# Patient Record
Sex: FEMALE | Race: BLACK OR AFRICAN AMERICAN | NOT HISPANIC OR LATINO
[De-identification: names, ages, dates, MRNs, and addresses within clinical notes are randomized per-mention and may not be internally consistent; named-entity substitution may affect disease eponyms.]

---

## 2017-02-22 ENCOUNTER — APPOINTMENT (OUTPATIENT)
Dept: SPINE | Facility: CLINIC | Age: 52
End: 2017-02-22

## 2017-02-22 VITALS
SYSTOLIC BLOOD PRESSURE: 138 MMHG | WEIGHT: 293 LBS | DIASTOLIC BLOOD PRESSURE: 86 MMHG | HEART RATE: 86 BPM | HEIGHT: 65 IN | BODY MASS INDEX: 48.82 KG/M2

## 2017-02-22 DIAGNOSIS — Z82.49 FAMILY HISTORY OF ISCHEMIC HEART DISEASE AND OTHER DISEASES OF THE CIRCULATORY SYSTEM: ICD-10-CM

## 2017-02-22 RX ORDER — HYDROCHLOROTHIAZIDE 25 MG/1
25 TABLET ORAL
Refills: 0 | Status: ACTIVE | COMMUNITY

## 2017-02-22 RX ORDER — AMLODIPINE BESYLATE 5 MG/1
5 TABLET ORAL
Qty: 90 | Refills: 0 | Status: ACTIVE | COMMUNITY
Start: 2017-02-09

## 2017-02-23 ENCOUNTER — APPOINTMENT (OUTPATIENT)
Dept: OTOLARYNGOLOGY | Facility: CLINIC | Age: 52
End: 2017-02-23

## 2017-02-23 VITALS
WEIGHT: 293 LBS | HEIGHT: 66 IN | BODY MASS INDEX: 47.09 KG/M2 | SYSTOLIC BLOOD PRESSURE: 127 MMHG | DIASTOLIC BLOOD PRESSURE: 72 MMHG | HEART RATE: 70 BPM

## 2017-02-23 DIAGNOSIS — I10 ESSENTIAL (PRIMARY) HYPERTENSION: ICD-10-CM

## 2017-08-02 ENCOUNTER — APPOINTMENT (OUTPATIENT)
Dept: SPINE | Facility: CLINIC | Age: 52
End: 2017-08-02
Payer: COMMERCIAL

## 2017-08-02 VITALS
HEART RATE: 73 BPM | HEIGHT: 65 IN | DIASTOLIC BLOOD PRESSURE: 86 MMHG | WEIGHT: 293 LBS | SYSTOLIC BLOOD PRESSURE: 121 MMHG | BODY MASS INDEX: 48.82 KG/M2

## 2017-08-02 PROCEDURE — 99214 OFFICE O/P EST MOD 30 MIN: CPT

## 2017-08-04 ENCOUNTER — APPOINTMENT (OUTPATIENT)
Dept: OTOLARYNGOLOGY | Facility: CLINIC | Age: 52
End: 2017-08-04
Payer: COMMERCIAL

## 2017-08-04 VITALS
WEIGHT: 293 LBS | HEART RATE: 63 BPM | BODY MASS INDEX: 48.82 KG/M2 | DIASTOLIC BLOOD PRESSURE: 98 MMHG | SYSTOLIC BLOOD PRESSURE: 153 MMHG | HEIGHT: 65 IN

## 2017-08-04 PROCEDURE — 92550 TYMPANOMETRY & REFLEX THRESH: CPT

## 2017-08-04 PROCEDURE — 31575 DIAGNOSTIC LARYNGOSCOPY: CPT

## 2017-08-04 PROCEDURE — 92588 EVOKED AUDITORY TST COMPLETE: CPT

## 2017-08-04 PROCEDURE — 92557 COMPREHENSIVE HEARING TEST: CPT

## 2017-08-04 PROCEDURE — 99214 OFFICE O/P EST MOD 30 MIN: CPT | Mod: 25

## 2018-08-08 ENCOUNTER — APPOINTMENT (OUTPATIENT)
Dept: SPINE | Facility: CLINIC | Age: 53
End: 2018-08-08
Payer: COMMERCIAL

## 2018-08-08 VITALS
WEIGHT: 293 LBS | DIASTOLIC BLOOD PRESSURE: 86 MMHG | HEART RATE: 69 BPM | SYSTOLIC BLOOD PRESSURE: 141 MMHG | BODY MASS INDEX: 48.82 KG/M2 | HEIGHT: 65 IN

## 2018-08-08 PROCEDURE — 99214 OFFICE O/P EST MOD 30 MIN: CPT

## 2018-08-29 ENCOUNTER — APPOINTMENT (OUTPATIENT)
Dept: OTOLARYNGOLOGY | Facility: CLINIC | Age: 53
End: 2018-08-29
Payer: COMMERCIAL

## 2018-08-29 VITALS
BODY MASS INDEX: 48.82 KG/M2 | WEIGHT: 293 LBS | HEIGHT: 65 IN | HEART RATE: 74 BPM | DIASTOLIC BLOOD PRESSURE: 76 MMHG | SYSTOLIC BLOOD PRESSURE: 132 MMHG

## 2018-08-29 DIAGNOSIS — Z87.898 PERSONAL HISTORY OF OTHER SPECIFIED CONDITIONS: ICD-10-CM

## 2018-08-29 PROCEDURE — 92550 TYMPANOMETRY & REFLEX THRESH: CPT

## 2018-08-29 PROCEDURE — 92557 COMPREHENSIVE HEARING TEST: CPT

## 2018-08-29 PROCEDURE — 99213 OFFICE O/P EST LOW 20 MIN: CPT | Mod: 25

## 2018-08-29 PROCEDURE — 31575 DIAGNOSTIC LARYNGOSCOPY: CPT

## 2018-09-16 PROBLEM — Z87.898 HISTORY OF EPISTAXIS: Status: RESOLVED | Noted: 2017-02-24 | Resolved: 2018-09-16

## 2019-08-14 ENCOUNTER — APPOINTMENT (OUTPATIENT)
Dept: SPINE | Facility: CLINIC | Age: 54
End: 2019-08-14
Payer: COMMERCIAL

## 2019-08-14 VITALS
SYSTOLIC BLOOD PRESSURE: 130 MMHG | HEIGHT: 65 IN | WEIGHT: 293 LBS | BODY MASS INDEX: 48.82 KG/M2 | DIASTOLIC BLOOD PRESSURE: 80 MMHG

## 2019-08-14 PROCEDURE — 99214 OFFICE O/P EST MOD 30 MIN: CPT

## 2019-08-14 NOTE — DATA REVIEWED
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 Compared to other imaging done in 2018, 2017 and 2016

## 2019-08-14 NOTE — RESULTS/DATA
[FreeTextEntry1] : No significant change in the size of glomus juglare tumor when compared to previous MRIs. Patient to return to office in 1 year and 1/2 with a new MRI brain [facility] IVC

## 2019-08-14 NOTE — REASON FOR VISIT
[Follow-Up: _____] : a [unfilled] follow-up visit [FreeTextEntry1] : Patient is doing well and offers no complaints. She had recent surgery to her left foot for removal of a benign mass. She is using a cane for ambulation

## 2019-08-14 NOTE — PHYSICAL EXAM
[General Appearance - Well Nourished] : well nourished [General Appearance - Alert] : alert [Cranial Nerves Optic (II)] : visual acuity intact bilaterally,  pupils equal round and reactive to light [Cranial Nerves Oculomotor (III)] : extraocular motion intact [Cranial Nerves Trigeminal (V)] : facial sensation intact symmetrically [Cranial Nerves Facial (VII)] : face symmetrical [Cranial Nerves Vestibulocochlear (VIII)] : hearing was intact bilaterally [Cranial Nerves Glossopharyngeal (IX)] : tongue and palate midline [Cranial Nerves Accessory (XI - Cranial And Spinal)] : head turning and shoulder shrug symmetric [Cranial Nerves Hypoglossal (XII)] : there was no tongue deviation with protrusion [Motor Tone] : muscle tone was normal in all four extremities [Motor Strength] : muscle strength was normal in all four extremities [5] : L3 quadriceps 5/5 [Balance] : balance was intact [2+] : Patella right 2+ [Sclera] : the sclera and conjunctiva were normal [PERRL With Normal Accommodation] : pupils were equal in size, round, reactive to light, with normal accommodation [] : no respiratory distress [Exaggerated Use Of Accessory Muscles For Inspiration] : no accessory muscle use

## 2019-08-20 ENCOUNTER — APPOINTMENT (OUTPATIENT)
Dept: OTOLARYNGOLOGY | Facility: CLINIC | Age: 54
End: 2019-08-20
Payer: COMMERCIAL

## 2019-08-20 VITALS
BODY MASS INDEX: 48.82 KG/M2 | SYSTOLIC BLOOD PRESSURE: 144 MMHG | HEART RATE: 92 BPM | DIASTOLIC BLOOD PRESSURE: 80 MMHG | WEIGHT: 293 LBS | HEIGHT: 65 IN

## 2019-08-20 DIAGNOSIS — Z92.3 PERSONAL HISTORY OF IRRADIATION: ICD-10-CM

## 2019-08-20 PROCEDURE — 99213 OFFICE O/P EST LOW 20 MIN: CPT

## 2019-08-20 PROCEDURE — 92557 COMPREHENSIVE HEARING TEST: CPT

## 2019-08-20 PROCEDURE — 92550 TYMPANOMETRY & REFLEX THRESH: CPT

## 2019-08-23 PROBLEM — Z92.3 STATUS POST GAMMA KNIFE TREATMENT: Status: RESOLVED | Noted: 2019-08-23 | Resolved: 2019-08-23

## 2019-08-23 NOTE — HISTORY OF PRESENT ILLNESS
[de-identified] : Ms. 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 No problems swallowing. \par Does not wear hearing aid much.  Denies changes in hearing since last year. 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

## 2019-08-23 NOTE — ASSESSMENT
[FreeTextEntry1] : Ms. 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  Has hearing aid but doesn’t use it.\par 4.) Tinnitus left ear - not notieable lately\par \par Plan:\par -- f//up with Dr. Arce also\par -- Repeat audiogram at next visit\par \par Return in 18 months\par

## 2019-08-23 NOTE — PHYSICAL EXAM
[de-identified] : TMs intact bilateral.  Prominent red mass in inferior 1/4 of space behind TM on left side. [] : septum deviated to the left [Midline] : trachea located in midline position [de-identified] : Mild atrophy and fasciculation left hemitongue.  Tongue protrudes to left. [de-identified] : Palate elevation is asymmetric, with eft side weaker. [Normal] : cranial nerves 2-12 intact [de-identified] : except for left XII; decreased left hearing and decreased left palate elevation.

## 2019-08-23 NOTE — CONSULT LETTER
[Dear  ___] : Dear  [unfilled], [Courtesy Letter:] : I had the pleasure of seeing your patient, [unfilled], in my office today. [Sincerely,] : Sincerely, [Consult Closing:] : Thank you very much for allowing me to participate in the care of this patient.  If you have any questions, please do not hesitate to contact me. [DrIsaac  ___] : Dr. YADAV [___] : [unfilled] [FreeTextEntry2] : Viktoriya Rajan MD\par [address]\[place] [address] [FreeTextEntry1] : \par Enclosed please find my office notes for August 20, 2019. \par \par \par \par  [FreeTextEntry3] : Jaylin Hoang MD \par Otolaryngology, Head and Neck Surgery\par

## 2020-12-04 ENCOUNTER — APPOINTMENT (OUTPATIENT)
Dept: OTOLARYNGOLOGY | Facility: CLINIC | Age: 55
End: 2020-12-04

## 2020-12-05 NOTE — HISTORY OF PRESENT ILLNESS
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

## 2020-12-05 NOTE — DATA REVIEWED
[de-identified] : MRI brain with and without contrast 8/12/2020:\par Impression:\par Stable left sided glomus jugulare tumor\par 2. Otherwise unremarkable MRI of the brain without significant change

## 2020-12-07 ENCOUNTER — APPOINTMENT (OUTPATIENT)
Dept: NEUROSURGERY | Facility: CLINIC | Age: 55
End: 2020-12-07

## 2020-12-09 ENCOUNTER — APPOINTMENT (OUTPATIENT)
Dept: SPINE | Facility: CLINIC | Age: 55
End: 2020-12-09
Payer: COMMERCIAL

## 2020-12-09 VITALS
DIASTOLIC BLOOD PRESSURE: 84 MMHG | WEIGHT: 293 LBS | HEIGHT: 65 IN | TEMPERATURE: 97.9 F | BODY MASS INDEX: 48.82 KG/M2 | OXYGEN SATURATION: 97 % | HEART RATE: 88 BPM | SYSTOLIC BLOOD PRESSURE: 152 MMHG

## 2020-12-09 PROCEDURE — 99213 OFFICE O/P EST LOW 20 MIN: CPT

## 2020-12-09 PROCEDURE — 99072 ADDL SUPL MATRL&STAF TM PHE: CPT

## 2020-12-09 RX ORDER — CHROMIUM 200 MCG
TABLET ORAL
Refills: 0 | Status: ACTIVE | COMMUNITY

## 2020-12-18 ENCOUNTER — APPOINTMENT (OUTPATIENT)
Dept: OTOLARYNGOLOGY | Facility: CLINIC | Age: 55
End: 2020-12-18
Payer: COMMERCIAL

## 2020-12-18 VITALS
DIASTOLIC BLOOD PRESSURE: 103 MMHG | HEIGHT: 65 IN | TEMPERATURE: 95.6 F | SYSTOLIC BLOOD PRESSURE: 167 MMHG | HEART RATE: 71 BPM | WEIGHT: 293 LBS | BODY MASS INDEX: 48.82 KG/M2

## 2020-12-18 PROCEDURE — 99214 OFFICE O/P EST MOD 30 MIN: CPT

## 2020-12-18 PROCEDURE — 92557 COMPREHENSIVE HEARING TEST: CPT

## 2020-12-18 PROCEDURE — 92550 TYMPANOMETRY & REFLEX THRESH: CPT

## 2020-12-18 PROCEDURE — 99072 ADDL SUPL MATRL&STAF TM PHE: CPT

## 2020-12-31 NOTE — PHYSICAL EXAM
[Midline] : trachea located in midline position [Normal] : no rashes [de-identified] : Right TM intact, no effusion.  Left TM intact; red mass seen in lower portion middle ear. [] : septum deviated to the left [de-identified] : Mild atrophy and fasciculation of tongue, left side.  Tongue protrudes to left. [de-identified] : Left soft palate elevation is very weak; right side intact. [de-identified] : Carotid pulses 2+ bilateral.  [de-identified] : intact except for left CN XII, IX; decreased hearing on left side.

## 2020-12-31 NOTE — CONSULT LETTER
[Dear  ___] : Dear  [unfilled], [Courtesy Letter:] : I had the pleasure of seeing your patient, [unfilled], in my office today. [Consult Closing:] : Thank you very much for allowing me to participate in the care of this patient.  If you have any questions, please do not hesitate to contact me. [Sincerely,] : Sincerely, [DrIsaac  ___] : Dr. YADAV [___] : [unfilled] [Please see my note below.] : Please see my note below. [FreeTextEntry2] : Viktoriya Rajan MD\par [address]\[place] [address] [FreeTextEntry1] : \par \par \par \par  [FreeTextEntry3] : Jaylin Hoang MD \par Otolaryngology, Head and Neck Surgery\par

## 2020-12-31 NOTE — HISTORY OF PRESENT ILLNESS
[de-identified] : Ms. 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 Denies changes in hearing since last year.  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 \par Known left hypoglossal nerve palsy.  No speech or swallowing problems.\par Recent MRI brain showed lesion was "stable, if not slightly smaller", per Dr. Arce, with whom she has next f/up in 2 years.\par \par No bleeding or irritation from nose.\par \par \par MRI BRAIN with and without contrast  (8/27/2020) at [facility]:\par - Stable left glomus jugulare tumor\par - Otherwise unremarkable MRI of brain without significant change.\par \par MRI BRAIN with and without contrast  (8/07/2019) at [facility]:\par --  Compared to other imaging done in 2018, 2017 and 2016\par -- No significant change in the size of the glomus juglare tumor.\par \par MRI BRAIN with and without contrast (7-) at [facility]:\par - No change in size of left glomus jugulare\par \par MRI BRAIN with and without contrast (7-) at [facility]:\par - Stable appearing multilobulated lesion involving the left jugular foramen when compared to 01/24/2017.\par

## 2020-12-31 NOTE — ASSESSMENT
[FreeTextEntry1] : Ms. 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  Portion still visible behind TM today; stable size.\par --> lower salt intake in diet\par --> f/u with Dr. Arce as scheduled\par \par 2.) High frequency SNHL left ear - stable.  Has hearing aid since 2016 but not getting much benefit now\par --> f/up with her vending audiologist re update of her hearing aid.  Otherwise new device may be better.\par \par 3.) Left hypoglossal and glossopharyngeal nerve palsies; stable \par \par Return in 2 years\par

## 2020-12-31 NOTE — DATA REVIEWED
[de-identified] : \par AUDIOGRAM  (12/18/2020)\par RIGHT:  Hearing within normal limits. \par LEFT:   Hearing within normal limits through 2K Hz, sloping to mild SNHL\par Hearing is essentially stable compared to audiogram from 2017 and 2018.\par Tympanograms  A  bilateral \par Word recognition 90% on right, 100%  on left \par \par AUDIOGRAM  (08/20/2019)\par RIGHT:  Hearing within normal limits. \par LEFT:   Hearing within normal limits through 1.5 K Hz, sloping to mild to moderate SNHL\par Hearing is essentially stable compared to audiogram from 2017 and 2018.\par Tympanograms  A on right; A on left\par Word recognition 100%  bilateral \par \par AUDIOGRAM  (08/29/2018)\par RIGHT:  Hearing within normal limits. \par LEFT:   Hearing within normal limits through 1.5 K Hz, sloping to mild to moderate SNHL\par Hearing is essentially stable compared to audiogram from 2017.\par Tympanograms  A on right; As on left\par Word recognition 100%  bilateral \par \par AUDIOGRAM (8/4/2017)\par RIGHT:  Hearing within normal limits.  \par LEFT:   Hearing within normal limits except for mild SNHL 2-8K Hz.\par Tympanograms  As on right, A on left\par Word recognition 100%  bilateral  \par OAEs:  present on Right, absent on Left\par

## 2021-09-16 ENCOUNTER — APPOINTMENT (OUTPATIENT)
Dept: OTOLARYNGOLOGY | Facility: CLINIC | Age: 56
End: 2021-09-16
Payer: COMMERCIAL

## 2021-09-16 VITALS
TEMPERATURE: 96.3 F | DIASTOLIC BLOOD PRESSURE: 101 MMHG | HEIGHT: 65 IN | SYSTOLIC BLOOD PRESSURE: 178 MMHG | HEART RATE: 81 BPM

## 2021-09-16 DIAGNOSIS — H91.92 UNSPECIFIED HEARING LOSS, LEFT EAR: ICD-10-CM

## 2021-09-16 PROCEDURE — 99214 OFFICE O/P EST MOD 30 MIN: CPT

## 2021-10-24 PROBLEM — H91.92 HEARING LOSS OF LEFT EAR: Status: RESOLVED | Noted: 2017-02-23 | Resolved: 2021-10-24

## 2021-10-24 NOTE — HISTORY OF PRESENT ILLNESS
[de-identified] : Ms. Kulkarni c/o Left ear pain sharp and strong that started suddenly last week; felt like she could touch area in her ear canal.  Seen at Urgent Care over weekend and given ofloxacin drops. \par Today ear pain is much less.  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  Followed by Dr. Arce.\par Known left hypoglossal nerve palsy. No speech or swallowing problems.  Next MRI due in 2022.\par No bleeding or irritation from nose.\par \par \par MRI BRAIN with and without contrast (8/27/2020) at [facility]:\par - Stable left glomus jugulare tumor\par - Otherwise unremarkable MRI of brain without significant change.\par \par MRI BRAIN with and without contrast (8/07/2019) at [facility]:\par -- Compared to other imaging done in 2018, 2017 and 2016\par -- No significant change in the size of the glomus juglare tumor.\par \par MRI BRAIN with and without contrast (7-) at [facility]:\par - No change in size of left glomus jugulare\par \par MRI BRAIN with and without contrast (7-) at [facility]:\par - Stable appearing multilobulated lesion involving the left jugular foramen when compared to 01/24/2017.\par

## 2021-10-24 NOTE — PHYSICAL EXAM
[FreeTextEntry1] : No hoarseness.  [de-identified] : RIGHT EAC clear.  LEFT EAC with healing furuncle at meatus. [de-identified] : RIGHT TM intact, no effusion.  LEFT TM intact; red mass seen in lower portion middle ear. [Normal] : palatine tonsils are normal [Midline] : trachea located in midline position [de-identified] : Mild atrophy and fasciculation of tongue, left side.  Tongue protrudes to left. [de-identified] : Left soft palate elevation is minimal; right side intact. [de-identified] : Deficits of left 8, 9 and 12 CNs.

## 2021-10-24 NOTE — CONSULT LETTER
[Dear  ___] : Dear  [unfilled], [Courtesy Letter:] : I had the pleasure of seeing your patient, [unfilled], in my office today. [Please see my note below.] : Please see my note below. [Consult Closing:] : Thank you very much for allowing me to participate in the care of this patient.  If you have any questions, please do not hesitate to contact me. [Sincerely,] : Sincerely, [FreeTextEntry2] : Viktoriya Rajan MD\par [address]\[place] [address] [FreeTextEntry1] : \par \par \par \par  [FreeTextEntry3] : Jaylin Hoang MD \par Otolaryngology, Head and Neck Surgery\par  [DrIsaac  ___] : Dr. YADAV

## 2021-10-24 NOTE — ASSESSMENT
[FreeTextEntry1] : Ms. BEDOLLA had the following issues addressed today:\par \par 1.) Left ear pain that started last week and is resolving now seems due to left EAC furuncle.\par If pain/swelling returns, wipe area of skin with alcohol or facial toner.\par \par 2.) Tinnitus and glomus jugulare tumor on left side are stable on serial exams and  MRIs.  \par High frequency SNHL left ear - stable.  \par The tumor is still visible behind left TM today.   Known CN 9 and 12 deficits on left also.\par --> f/u with Dr. Arce as scheduled\par \par Return in 1 year\par

## 2021-10-24 NOTE — DATA REVIEWED
[de-identified] : \par AUDIOGRAM  (12/18/2020)\par RIGHT:  Hearing within normal limits. \par LEFT:   Hearing within normal limits through 2K Hz, sloping to mild SNHL\par Hearing is essentially stable compared to audiogram from 2017 and 2018.\par Tympanograms  A  bilateral \par Word recognition 90% on right, 100%  on left \par \par AUDIOGRAM  (08/20/2019)\par RIGHT:  Hearing within normal limits. \par LEFT:   Hearing within normal limits through 1.5 K Hz, sloping to mild to moderate SNHL\par Hearing is essentially stable compared to audiogram from 2017 and 2018.\par Tympanograms  A on right; A on left\par Word recognition 100%  bilateral \par \par AUDIOGRAM  (08/29/2018)\par RIGHT:  Hearing within normal limits. \par LEFT:   Hearing within normal limits through 1.5 K Hz, sloping to mild to moderate SNHL\par Hearing is essentially stable compared to audiogram from 2017.\par Tympanograms  A on right; As on left\par Word recognition 100%  bilateral \par \par AUDIOGRAM (8/4/2017)\par RIGHT:  Hearing within normal limits.  \par LEFT:   Hearing within normal limits except for mild SNHL 2-8K Hz.\par Tympanograms  As on right, A on left\par Word recognition 100%  bilateral  \par OAEs:  present on Right, absent on Left\par

## 2022-08-04 ENCOUNTER — APPOINTMENT (OUTPATIENT)
Dept: OTOLARYNGOLOGY | Facility: CLINIC | Age: 57
End: 2022-08-04

## 2022-08-17 ENCOUNTER — APPOINTMENT (OUTPATIENT)
Dept: SPINE | Facility: CLINIC | Age: 57
End: 2022-08-17

## 2022-08-17 VITALS
SYSTOLIC BLOOD PRESSURE: 136 MMHG | OXYGEN SATURATION: 84 % | WEIGHT: 293 LBS | HEART RATE: 86 BPM | BODY MASS INDEX: 48.82 KG/M2 | HEIGHT: 65 IN | DIASTOLIC BLOOD PRESSURE: 77 MMHG

## 2022-08-17 PROCEDURE — 99213 OFFICE O/P EST LOW 20 MIN: CPT

## 2022-10-06 ENCOUNTER — APPOINTMENT (OUTPATIENT)
Dept: OTOLARYNGOLOGY | Facility: CLINIC | Age: 57
End: 2022-10-06

## 2022-10-06 VITALS
BODY MASS INDEX: 48.82 KG/M2 | WEIGHT: 293 LBS | HEART RATE: 86 BPM | TEMPERATURE: 95.9 F | HEIGHT: 65 IN | DIASTOLIC BLOOD PRESSURE: 120 MMHG | SYSTOLIC BLOOD PRESSURE: 163 MMHG

## 2022-10-06 DIAGNOSIS — J31.0 CHRONIC RHINITIS: ICD-10-CM

## 2022-10-06 DIAGNOSIS — H60.02 ABSCESS OF LEFT EXTERNAL EAR: ICD-10-CM

## 2022-10-06 DIAGNOSIS — H92.02 OTALGIA, LEFT EAR: ICD-10-CM

## 2022-10-06 PROCEDURE — 92550 TYMPANOMETRY & REFLEX THRESH: CPT | Mod: 52

## 2022-10-06 PROCEDURE — 92557 COMPREHENSIVE HEARING TEST: CPT

## 2022-10-06 PROCEDURE — 99213 OFFICE O/P EST LOW 20 MIN: CPT

## 2022-10-06 NOTE — DATA REVIEWED
[de-identified] : \par AUDIOGRAM  (10/06/2022)\par RIGHT:  Hearing within normal limits. \par LEFT:   Hearing within normal limits through 2K Hz, sloping to profound SNHL\par Hearing is worse compared to audiogram from 2020\par Tympanograms  A on right; As on left\par Word recognition 100% bilateral  \par \par AUDIOGRAM  (12/18/2020)\par RIGHT:  Hearing within normal limits. \par LEFT:   Hearing within normal limits through 2K Hz, sloping to mild SNHL\par Hearing is essentially stable compared to audiogram from 2017 and 2018.\par Tympanograms  A  bilateral \par Word recognition 90% on right, 100%  on left \par \par AUDIOGRAM  (08/20/2019)\par RIGHT:  Hearing within normal limits. \par LEFT:   Hearing within normal limits through 1.5 K Hz, sloping to mild to moderate SNHL\par Hearing is essentially stable compared to audiogram from 2017 and 2018.\par Tympanograms  A on right; A on left\par Word recognition 100%  bilateral \par \par AUDIOGRAM  (08/29/2018)\par RIGHT:  Hearing within normal limits. \par LEFT:   Hearing within normal limits through 1.5 K Hz, sloping to mild to moderate SNHL\par Hearing is essentially stable compared to audiogram from 2017.\par Tympanograms  A on right; As on left\par Word recognition 100%  bilateral \par \par AUDIOGRAM (8/4/2017)\par RIGHT:  Hearing within normal limits.  \par LEFT:   Hearing within normal limits except for mild SNHL 2-8K Hz.\par Tympanograms  As on right, A on left\par Word recognition 100%  bilateral  \par OAEs:  present on Right, absent on Left\par

## 2022-10-06 NOTE — CONSULT LETTER
[Dear  ___] : Dear  [unfilled], [Courtesy Letter:] : I had the pleasure of seeing your patient, [unfilled], in my office today. [Please see my note below.] : Please see my note below. [Consult Closing:] : Thank you very much for allowing me to participate in the care of this patient.  If you have any questions, please do not hesitate to contact me. [Sincerely,] : Sincerely, [DrIsaac  ___] : Dr. YADAV [FreeTextEntry2] : Viktoriya Rajan MD\par [address]\[place] [address] [FreeTextEntry1] : \par \par \par \par  [FreeTextEntry3] : Jaylin Hoang MD \par Otolaryngology, Head and Neck Surgery\par

## 2022-10-06 NOTE — ASSESSMENT
[FreeTextEntry1] : Ms. 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  The tumor is still visible behind left TM.  The left-sided  CN 9 and 12 deficits are unchanged.\par The left SNHL is worse on today's audiogram - sloping to profound SNHL, rather than mild SNHL as had been shown in 2018 and 2020. Right hearing is within normal limits.  Word recognition is 100% bilateral.  \par She has not noticed a difference and still reports satisfaction with hearing aid.\par \par Return in 1 year\par

## 2022-10-06 NOTE — HISTORY OF PRESENT ILLNESS
[de-identified] : Ms. 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 Followed by Dr. Arce. She had MRI this year (stable); will see him again in 2 years.\par Known left hypoglossal and glossopharyngeal nerve palsies. No speech or swallowing problems. \par No ear d/c, ear pain, vertigo or much change in hearing.\par Satisfied with same hearing aid on left side.\par \par \par MRI BRAIN WITHOUT AND WITH CONTRAST (07-) at [facility]\par - COMPARISON:  MRI brain 8/27/2020\par - Stable avidly enhancing hypervascular mass in the left jugular fossa measuring 2 cm TV x 1.6 cm AP x 1.5 cm CC consistent with history of glomus jugulare tumor.\par - Redemonstration few scattered nonspecific T2 FLAIR hyperintense white matter lesions. Diffusion weighted images of the brain demonstrate no evidence of acute infarction. There is no evidence of acute intracranial hemorrhage, midline shift, herniation, or hydrocephalus. No significant abnormality on the susceptibility sequence.\par - Stable cerebellar tonsillar ectopia. The ventricles, sulci, cisterns are age appropriate.   \par - The major vascular flow voids are intact.\par -  Redemonstration of hypertrophy of Waldeyer's ring. The visualized paranasal sinuses and mastoid air cells are clear. \par -  Orbits are unremarkable.\par IMPRESSION: \par Stable avidly enhancing hypervascular mass in the left jugular fossa measuring 2 cm TV x 1.6 cm AP x 1.5 cm CC consistent with history of glomus jugulare tumor.\par (Images were reviewed.) \par \par \par MRI BRAIN with and without contrast (8/27/2020) at [facility]:\par - Stable left glomus jugulare tumor\par - Otherwise unremarkable MRI of brain without significant change.\par \par \par MRI BRAIN with and without contrast (8/07/2019) at [facility]:\par -- Compared to other imaging done in 2018, 2017 and 2016\par -- No significant change in the size of the glomus jugulare tumor.\par \par \par MRI BRAIN with and without contrast (7-) at [facility]:\par - No change in size of left glomus jugulare\par \par \par MRI BRAIN with and without contrast (7-) at [facility]:\par - Stable appearing multilobulated lesion involving the left jugular foramen when compared to 01/24/2017.\par \par

## 2022-10-06 NOTE — PHYSICAL EXAM
[Midline] : trachea located in midline position [FreeTextEntry1] : No hoarseness.  [de-identified] : RIGHT TM intact, no effusion. LEFT TM intact; red mass seen in lower portion middle ear.  [de-identified] : Mild atrophy and fasciculation of tongue, left side. Tongue protrudes to left.  [de-identified] : Left soft palate elevation is minimal; right side normal. [Normal] : orientation to person, place, and time: normal [de-identified] : Deficits left CN VIII, IX, XII.

## 2023-10-18 ENCOUNTER — APPOINTMENT (OUTPATIENT)
Dept: OTOLARYNGOLOGY | Facility: CLINIC | Age: 58
End: 2023-10-18
Payer: COMMERCIAL

## 2023-10-18 VITALS
HEIGHT: 65 IN | DIASTOLIC BLOOD PRESSURE: 103 MMHG | TEMPERATURE: 96 F | BODY MASS INDEX: 48.82 KG/M2 | WEIGHT: 293 LBS | HEART RATE: 84 BPM | SYSTOLIC BLOOD PRESSURE: 145 MMHG

## 2023-10-18 DIAGNOSIS — D44.7 NEOPLASM OF UNCERTAIN BEHAVIOR OF AORTIC BODY AND OTHER PARAGANGLIA: ICD-10-CM

## 2023-10-18 DIAGNOSIS — H93.A2 PULSATILE TINNITUS, LEFT EAR: ICD-10-CM

## 2023-10-18 DIAGNOSIS — G52.1 DISORDERS OF GLOSSOPHARYNGEAL NERVE: ICD-10-CM

## 2023-10-18 DIAGNOSIS — G52.3 DISORDERS OF HYPOGLOSSAL NERVE: ICD-10-CM

## 2023-10-18 DIAGNOSIS — H90.42 SENSORINEURAL HEARING LOSS, UNILATERAL, LEFT EAR, WITH UNRESTRICTED HEARING ON THE CONTRALATERAL SIDE: ICD-10-CM

## 2023-10-18 PROCEDURE — 99213 OFFICE O/P EST LOW 20 MIN: CPT

## 2023-10-18 PROCEDURE — 92557 COMPREHENSIVE HEARING TEST: CPT

## 2023-10-18 PROCEDURE — 92550 TYMPANOMETRY & REFLEX THRESH: CPT | Mod: 52

## 2023-10-20 ENCOUNTER — APPOINTMENT (OUTPATIENT)
Dept: ORTHOPEDIC SURGERY | Facility: CLINIC | Age: 58
End: 2023-10-20
Payer: COMMERCIAL

## 2023-10-20 VITALS — HEIGHT: 65 IN | BODY MASS INDEX: 48.82 KG/M2 | WEIGHT: 293 LBS

## 2023-10-20 PROCEDURE — 99204 OFFICE O/P NEW MOD 45 MIN: CPT

## 2023-10-20 PROCEDURE — 73562 X-RAY EXAM OF KNEE 3: CPT | Mod: 50

## 2023-10-23 RX ORDER — HYALURONATE SODIUM, STABILIZED 88 MG/4 ML
88 SYRINGE (ML) INTRAARTICULAR
Qty: 2 | Refills: 1 | Status: ACTIVE | OUTPATIENT
Start: 2023-10-23

## 2023-12-15 ENCOUNTER — APPOINTMENT (OUTPATIENT)
Dept: ORTHOPEDIC SURGERY | Facility: CLINIC | Age: 58
End: 2023-12-15
Payer: COMMERCIAL

## 2023-12-15 DIAGNOSIS — M17.0 BILATERAL PRIMARY OSTEOARTHRITIS OF KNEE: ICD-10-CM

## 2023-12-15 PROCEDURE — 20610 DRAIN/INJ JOINT/BURSA W/O US: CPT | Mod: 50

## 2023-12-15 NOTE — REASON FOR VISIT
[Follow-Up Visit] : a follow-up visit for [Other: ____] : [unfilled] [FreeTextEntry2] : here for monovisc injection

## 2023-12-15 NOTE — HISTORY OF PRESENT ILLNESS
[de-identified] : 58 year old female followup for bilateral knee pain. She is here for bilateral monovisc injections.    lot:[id number] exp:06/30/2026

## 2023-12-15 NOTE — PROCEDURE
[de-identified] : Procedure: Left Knee Joint injection with Monovisc Pre-Procedure Diagnosis: Left knee pain Post-Procedure Diagnosis: same  The patient was educated about the risks and benefits of an HA injection.  Alternatives were discussed.  The patient understood and consented for the procedure.  The area was sterilely prepped using isopropyl alcohol.  An ethyl chloride spray provided  local anesthesia.  Using the usual sterile technique, Monovisc was injected into the joint.  A dressing was applied to the area.  The patient tolerated the procedure well and without complication.  Procedure: Right Knee Joint injection with Monovisc Pre-Procedure Diagnosis: Right knee pain Post-Procedure Diagnosis: same  The patient was educated about the risks and benefits of an HA injection.  Alternatives were discussed.  The patient understood and consented for the procedure.  The area was sterilely prepped using isopropyl alcohol.  An ethyl chloride spray provided  local anesthesia.  Using the usual sterile technique, Monovisc was injected into the joint.  A dressing was applied to the area.  The patient tolerated the procedure well and without complication.

## 2023-12-15 NOTE — ASSESSMENT
[FreeTextEntry1] : 58 year old female followup for bilateral knee pain. She is here for bilateral monovisc injections.  She was given the injections which she tolerated well. We discussed red flag symptoms that would require emergent evaluation. She knows to call with any questions or concerns or if her symptoms acutely worsen.

## 2023-12-15 NOTE — PHYSICAL EXAM
[de-identified] : General: No acute distress, conversant, well-nourished. Head: Normocephalic, atraumatic Neck: trachea midline, FROM Heart: normotensive and normal rate and rhythm Lungs: No labored breathing Skin: No abrasions, no rashes, no edema Psych: Alert and oriented to person, place and time Extremities: no peripheral edema or digital cyanosis Gait: Normal gait. Can perform tandem gait.   Vascular: warm and well perfused distally, palpable distal pulses  Bilateral Knee with no erythema, no effusion.   No tenderness to palpation of knee. No medial joint line tenderness. No lateral joint line tenderness.  Range of motion: 0 - 130 degrees + pain with range of motion.  Negative Rui's test. Stable to varus and valgus stress. Negative Lachman's test, negative anterior and posterior drawer tests.    Sensation intact to light touch.   Normal motor exam. Warm and well perfused distally.

## 2024-12-18 ENCOUNTER — APPOINTMENT (OUTPATIENT)
Dept: SPINE | Facility: CLINIC | Age: 59
End: 2024-12-18
Payer: COMMERCIAL

## 2024-12-18 ENCOUNTER — NON-APPOINTMENT (OUTPATIENT)
Age: 59
End: 2024-12-18

## 2024-12-18 VITALS — HEIGHT: 65 IN | WEIGHT: 293 LBS | BODY MASS INDEX: 48.82 KG/M2

## 2024-12-18 DIAGNOSIS — D44.7 NEOPLASM OF UNCERTAIN BEHAVIOR OF AORTIC BODY AND OTHER PARAGANGLIA: ICD-10-CM

## 2024-12-18 DIAGNOSIS — H93.A2 PULSATILE TINNITUS, LEFT EAR: ICD-10-CM

## 2024-12-18 PROCEDURE — 99213 OFFICE O/P EST LOW 20 MIN: CPT

## 2025-01-02 ENCOUNTER — APPOINTMENT (OUTPATIENT)
Dept: OTOLARYNGOLOGY | Facility: CLINIC | Age: 60
End: 2025-01-02

## 2025-02-19 ENCOUNTER — NON-APPOINTMENT (OUTPATIENT)
Age: 60
End: 2025-02-19

## 2025-02-19 ENCOUNTER — APPOINTMENT (OUTPATIENT)
Dept: OTOLARYNGOLOGY | Facility: CLINIC | Age: 60
End: 2025-02-19

## 2025-02-19 VITALS
DIASTOLIC BLOOD PRESSURE: 90 MMHG | HEIGHT: 65 IN | WEIGHT: 293 LBS | BODY MASS INDEX: 48.82 KG/M2 | SYSTOLIC BLOOD PRESSURE: 162 MMHG | TEMPERATURE: 96.7 F | HEART RATE: 70 BPM

## 2025-02-19 DIAGNOSIS — D44.7 NEOPLASM OF UNCERTAIN BEHAVIOR OF AORTIC BODY AND OTHER PARAGANGLIA: ICD-10-CM

## 2025-02-19 DIAGNOSIS — H90.42 SENSORINEURAL HEARING LOSS, UNILATERAL, LEFT EAR, WITH UNRESTRICTED HEARING ON THE CONTRALATERAL SIDE: ICD-10-CM

## 2025-02-19 DIAGNOSIS — G52.1 DISORDERS OF GLOSSOPHARYNGEAL NERVE: ICD-10-CM

## 2025-02-19 DIAGNOSIS — G52.3 DISORDERS OF HYPOGLOSSAL NERVE: ICD-10-CM

## 2025-02-19 DIAGNOSIS — Z97.4 PRESENCE OF EXTERNAL HEARING-AID: ICD-10-CM

## 2025-02-19 DIAGNOSIS — H93.A2 PULSATILE TINNITUS, LEFT EAR: ICD-10-CM

## 2025-02-19 PROCEDURE — 99214 OFFICE O/P EST MOD 30 MIN: CPT

## 2025-02-21 PROBLEM — Z97.4 WEARS HEARING AID IN LEFT EAR: Status: ACTIVE | Noted: 2025-02-21

## 2025-03-31 ENCOUNTER — APPOINTMENT (OUTPATIENT)
Dept: OTOLARYNGOLOGY | Facility: CLINIC | Age: 60
End: 2025-03-31